# Patient Record
Sex: MALE | Race: WHITE | NOT HISPANIC OR LATINO | URBAN - METROPOLITAN AREA
[De-identification: names, ages, dates, MRNs, and addresses within clinical notes are randomized per-mention and may not be internally consistent; named-entity substitution may affect disease eponyms.]

---

## 2017-04-19 ENCOUNTER — EMERGENCY (EMERGENCY)
Facility: HOSPITAL | Age: 57
LOS: 1 days | Discharge: HOME | End: 2017-04-19
Attending: EMERGENCY MEDICINE | Admitting: EMERGENCY MEDICINE
Payer: SELF-PAY

## 2017-04-19 VITALS
TEMPERATURE: 98 F | HEART RATE: 70 BPM | OXYGEN SATURATION: 98 % | SYSTOLIC BLOOD PRESSURE: 138 MMHG | DIASTOLIC BLOOD PRESSURE: 74 MMHG | RESPIRATION RATE: 16 BRPM

## 2017-04-19 VITALS
SYSTOLIC BLOOD PRESSURE: 144 MMHG | OXYGEN SATURATION: 99 % | HEART RATE: 68 BPM | DIASTOLIC BLOOD PRESSURE: 72 MMHG | TEMPERATURE: 98 F | RESPIRATION RATE: 15 BRPM

## 2017-04-19 DIAGNOSIS — T23.361A BURN OF THIRD DEGREE OF BACK OF RIGHT HAND, INITIAL ENCOUNTER: ICD-10-CM

## 2017-04-19 DIAGNOSIS — F17.210 NICOTINE DEPENDENCE, CIGARETTES, UNCOMPLICATED: ICD-10-CM
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
## 2017-04-19 DIAGNOSIS — T23.061A BURN OF UNSPECIFIED DEGREE OF BACK OF RIGHT HAND, INITIAL ENCOUNTER: ICD-10-CM
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
## 2017-04-19 DIAGNOSIS — T30.0 BURN OF UNSPECIFIED BODY REGION, UNSPECIFIED DEGREE: ICD-10-CM

## 2017-04-19 PROCEDURE — 99285 EMERGENCY DEPT VISIT HI MDM: CPT | Mod: 25

## 2017-04-19 PROCEDURE — 99283 EMERGENCY DEPT VISIT LOW MDM: CPT

## 2017-04-19 RX ADMIN — Medication 1 APPLICATION(S): at 11:40

## 2017-04-19 NOTE — ED ADULT NURSE REASSESSMENT NOTE - NS ED NURSE REASSESS COMMENT FT1
Silvadene applied to right hand, dressing with nonadherent pads applied with delio. Pt tolerated well.

## 2017-04-19 NOTE — CONSULT NOTE ADULT - SUBJECTIVE AND OBJECTIVE BOX
The patient suffered a burn to his right hand on a hot iron earlier today at work.      On exam, there is an extensive dry blister with no drainage. The tissue underlying the blister is gray in color, not bleeding, and is anesthetic to touch. The blister extends from the dorsum of the right hand to the index and middle fingers and to the base of the right thumb. There is full ROM of all fingers at this time.

## 2017-04-19 NOTE — ED PROVIDER NOTE - SKIN, MLM
Skin normal color for race, warm, dry. burn to dorsum of hand radial aspect and prox aspect of 2nd and 3rd finger, non circumferential. total burn 1% bsa, mostly 2nd degree some 1st degree. full rom, distal n/v intact, sensation intact, cap refill < 2 sec all fingers Skin normal color for race, warm, dry. burn to dorsum of hand radial aspect and prox aspect of 2nd and 3rd finger, non circumferential. total burn 1% bsa, mostly 2nd degree with large blistering. full rom, distal n/v intact, sensation intact, cap refill < 2 sec all fingers

## 2017-04-19 NOTE — ED PROVIDER NOTE - OBJECTIVE STATEMENT
pt c/p burn to dorsum of right hand s/p injured by hot press at work in  this am. no weakness numbness or any other injury. tetanus utd.   pmd - none

## 2017-04-19 NOTE — ED PROVIDER NOTE - PROGRESS NOTE DETAILS
pt seen by wound center, blister deroofed, burn is 3rd degree, requests consult [facility] burn. D/W fellow at [facility] burn unit, he requests send pt to [facility] to be seen at 1230. Reevaluated patient at bedside.  An opportunity to ask questions was given.  Discussed the importance of prompt, close medical follow-up.  Patient will return with any changes, concerns or persistent / worsening symptoms.  Understanding of all instructions verbalized.

## 2017-04-19 NOTE — ED ADULT NURSE NOTE - OBJECTIVE STATEMENT
Pt arrived c/o burn to right hand at work today. pt "burned on dry cleaning press" Pt has redness and blister to right top of hand onto the 3rd and 4th fingers. No acute distress pt able to move hand, blister in tact. Hand wrapped in saline soaked gauze and delio pt tolerated well. VSS pt awaiting wound care.